# Patient Record
Sex: FEMALE | Race: WHITE | Employment: FULL TIME | URBAN - METROPOLITAN AREA
[De-identification: names, ages, dates, MRNs, and addresses within clinical notes are randomized per-mention and may not be internally consistent; named-entity substitution may affect disease eponyms.]

---

## 2022-05-11 RX ORDER — CETIRIZINE HYDROCHLORIDE 10 MG/1
CAPSULE, LIQUID FILLED ORAL
COMMUNITY

## 2022-05-11 NOTE — PROGRESS NOTES
Patient completed phone assessment and gave verbal understanding of pre operative education. Patient POC urine dipstick order entered per policy.

## 2022-05-13 RX ORDER — TRANEXAMIC ACID 100 MG/ML
100 INJECTION, SOLUTION INTRAVENOUS ONCE
Status: CANCELLED | OUTPATIENT
Start: 2022-05-13 | End: 2022-05-13

## 2022-05-16 ENCOUNTER — APPOINTMENT (OUTPATIENT)
Dept: GENERAL RADIOLOGY | Age: 37
End: 2022-05-16
Attending: ORTHOPAEDIC SURGERY
Payer: COMMERCIAL

## 2022-05-16 ENCOUNTER — ANESTHESIA EVENT (OUTPATIENT)
Dept: SURGERY | Age: 37
End: 2022-05-16
Payer: COMMERCIAL

## 2022-05-16 ENCOUNTER — ANESTHESIA (OUTPATIENT)
Dept: SURGERY | Age: 37
End: 2022-05-16
Payer: COMMERCIAL

## 2022-05-16 ENCOUNTER — HOSPITAL ENCOUNTER (OUTPATIENT)
Age: 37
Discharge: HOME | End: 2022-05-17
Attending: ORTHOPAEDIC SURGERY | Admitting: ORTHOPAEDIC SURGERY
Payer: COMMERCIAL

## 2022-05-16 DIAGNOSIS — M25.361 PATELLOFEMORAL INSTABILITY OF RIGHT KNEE WITH PAIN: Primary | ICD-10-CM

## 2022-05-16 DIAGNOSIS — M25.561 PATELLOFEMORAL INSTABILITY OF RIGHT KNEE WITH PAIN: Primary | ICD-10-CM

## 2022-05-16 LAB
ALBUMIN SERPL-MCNC: 3.4 G/DL (ref 3.5–5)
ALBUMIN/GLOB SERPL: 1 {RATIO} (ref 1.1–2.2)
ALP SERPL-CCNC: 21 U/L (ref 45–117)
ALT SERPL-CCNC: 14 U/L (ref 12–78)
ANION GAP SERPL CALC-SCNC: 9 MMOL/L (ref 5–15)
AST SERPL W P-5'-P-CCNC: 17 U/L (ref 15–37)
BASOPHILS # BLD: 0 K/UL (ref 0–0.1)
BASOPHILS NFR BLD: 0 % (ref 0–1)
BILIRUB SERPL-MCNC: 0.4 MG/DL (ref 0.2–1)
BUN SERPL-MCNC: 17 MG/DL (ref 6–20)
BUN/CREAT SERPL: 13 (ref 12–20)
CA-I BLD-MCNC: 8.5 MG/DL (ref 8.5–10.1)
CHLORIDE SERPL-SCNC: 106 MMOL/L (ref 97–108)
CO2 SERPL-SCNC: 20 MMOL/L (ref 21–32)
CREAT SERPL-MCNC: 1.26 MG/DL (ref 0.55–1.02)
DIFFERENTIAL METHOD BLD: ABNORMAL
EOSINOPHIL # BLD: 0 K/UL (ref 0–0.4)
EOSINOPHIL NFR BLD: 0 % (ref 0–7)
ERYTHROCYTE [DISTWIDTH] IN BLOOD BY AUTOMATED COUNT: 13.1 % (ref 11.5–14.5)
GLOBULIN SER CALC-MCNC: 3.4 G/DL (ref 2–4)
GLUCOSE SERPL-MCNC: 239 MG/DL (ref 65–100)
HCG UR QL: NEGATIVE
HCT VFR BLD AUTO: 36.8 % (ref 35–47)
HGB BLD-MCNC: 12.1 G/DL (ref 11.5–16)
IMM GRANULOCYTES # BLD AUTO: 0 K/UL (ref 0–0.04)
IMM GRANULOCYTES NFR BLD AUTO: 0 % (ref 0–0.5)
INR PPP: 1.1 (ref 0.9–1.1)
LYMPHOCYTES # BLD: 0.7 K/UL (ref 0.8–3.5)
LYMPHOCYTES NFR BLD: 8 % (ref 12–49)
MCH RBC QN AUTO: 31.2 PG (ref 26–34)
MCHC RBC AUTO-ENTMCNC: 32.9 G/DL (ref 30–36.5)
MCV RBC AUTO: 94.8 FL (ref 80–99)
MONOCYTES # BLD: 0.1 K/UL (ref 0–1)
MONOCYTES NFR BLD: 2 % (ref 5–13)
MRSA DNA SPEC QL NAA+PROBE: NOT DETECTED
NEUTS SEG # BLD: 8.5 K/UL (ref 1.8–8)
NEUTS SEG NFR BLD: 90 % (ref 32–75)
NRBC # BLD: 0 K/UL (ref 0–0.01)
NRBC BLD-RTO: 0 PER 100 WBC
PLATELET # BLD AUTO: 212 K/UL (ref 150–400)
PMV BLD AUTO: 9.9 FL (ref 8.9–12.9)
POTASSIUM SERPL-SCNC: 4.3 MMOL/L (ref 3.5–5.1)
PROT SERPL-MCNC: 6.8 G/DL (ref 6.4–8.2)
PROTHROMBIN TIME: 14.2 SEC (ref 11.9–14.6)
RBC # BLD AUTO: 3.88 M/UL (ref 3.8–5.2)
SODIUM SERPL-SCNC: 135 MMOL/L (ref 136–145)
WBC # BLD AUTO: 9.4 K/UL (ref 3.6–11)

## 2022-05-16 PROCEDURE — 94762 N-INVAS EAR/PLS OXIMTRY CONT: CPT

## 2022-05-16 PROCEDURE — 76010000173 HC OR TIME 3 TO 3.5 HR INTENSV-TIER 1: Performed by: ORTHOPAEDIC SURGERY

## 2022-05-16 PROCEDURE — 74011250636 HC RX REV CODE- 250/636: Performed by: GENERAL ACUTE CARE HOSPITAL

## 2022-05-16 PROCEDURE — 74011000250 HC RX REV CODE- 250: Performed by: GENERAL ACUTE CARE HOSPITAL

## 2022-05-16 PROCEDURE — C1769 GUIDE WIRE: HCPCS | Performed by: ORTHOPAEDIC SURGERY

## 2022-05-16 PROCEDURE — 74011000250 HC RX REV CODE- 250: Performed by: ORTHOPAEDIC SURGERY

## 2022-05-16 PROCEDURE — C1713 ANCHOR/SCREW BN/BN,TIS/BN: HCPCS | Performed by: ORTHOPAEDIC SURGERY

## 2022-05-16 PROCEDURE — 77030003775: Performed by: ORTHOPAEDIC SURGERY

## 2022-05-16 PROCEDURE — 85025 COMPLETE CBC W/AUTO DIFF WBC: CPT

## 2022-05-16 PROCEDURE — 74011250637 HC RX REV CODE- 250/637: Performed by: PHYSICIAN ASSISTANT

## 2022-05-16 PROCEDURE — 77030002933 HC SUT MCRYL J&J -A: Performed by: ORTHOPAEDIC SURGERY

## 2022-05-16 PROCEDURE — 77030031139 HC SUT VCRL2 J&J -A: Performed by: ORTHOPAEDIC SURGERY

## 2022-05-16 PROCEDURE — 80053 COMPREHEN METABOLIC PANEL: CPT

## 2022-05-16 PROCEDURE — 77030008684 HC TU ET CUF COVD -B: Performed by: GENERAL ACUTE CARE HOSPITAL

## 2022-05-16 PROCEDURE — 87641 MR-STAPH DNA AMP PROBE: CPT

## 2022-05-16 PROCEDURE — 74011250636 HC RX REV CODE- 250/636: Performed by: ORTHOPAEDIC SURGERY

## 2022-05-16 PROCEDURE — 77030042022 HC SYST COLD THRPY BREG -B: Performed by: ORTHOPAEDIC SURGERY

## 2022-05-16 PROCEDURE — 77030002922 HC SUT FBRWRE ARTH -B: Performed by: ORTHOPAEDIC SURGERY

## 2022-05-16 PROCEDURE — 74011250637 HC RX REV CODE- 250/637: Performed by: ORTHOPAEDIC SURGERY

## 2022-05-16 PROCEDURE — C1762 CONN TISS, HUMAN(INC FASCIA): HCPCS | Performed by: ORTHOPAEDIC SURGERY

## 2022-05-16 PROCEDURE — 77030029642: Performed by: ORTHOPAEDIC SURGERY

## 2022-05-16 PROCEDURE — 76210000006 HC OR PH I REC 0.5 TO 1 HR: Performed by: ORTHOPAEDIC SURGERY

## 2022-05-16 PROCEDURE — 77030000032 HC CUF TRNQT ZIMM -B: Performed by: ORTHOPAEDIC SURGERY

## 2022-05-16 PROCEDURE — 85610 PROTHROMBIN TIME: CPT

## 2022-05-16 PROCEDURE — 77030026438 HC STYL ET INTUB CARD -A: Performed by: GENERAL ACUTE CARE HOSPITAL

## 2022-05-16 PROCEDURE — 77030013789 HC KT REP BIO TEND ARTH -C: Performed by: ORTHOPAEDIC SURGERY

## 2022-05-16 PROCEDURE — 76000 FLUOROSCOPY <1 HR PHYS/QHP: CPT

## 2022-05-16 PROCEDURE — 77030018933 HC KT TRNSTIB ACL4 ARTH -C: Performed by: ORTHOPAEDIC SURGERY

## 2022-05-16 PROCEDURE — 77030013727 HC IRR FAN PULSVC ZIMM -B: Performed by: ORTHOPAEDIC SURGERY

## 2022-05-16 PROCEDURE — 74011000258 HC RX REV CODE- 258: Performed by: ORTHOPAEDIC SURGERY

## 2022-05-16 PROCEDURE — 81025 URINE PREGNANCY TEST: CPT

## 2022-05-16 PROCEDURE — 2709999900 HC NON-CHARGEABLE SUPPLY: Performed by: ORTHOPAEDIC SURGERY

## 2022-05-16 PROCEDURE — 77030013079 HC BLNKT BAIR HGGR 3M -A: Performed by: GENERAL ACUTE CARE HOSPITAL

## 2022-05-16 PROCEDURE — 77030042316 HC BLD SAW -B: Performed by: ORTHOPAEDIC SURGERY

## 2022-05-16 PROCEDURE — 76060000037 HC ANESTHESIA 3 TO 3.5 HR: Performed by: ORTHOPAEDIC SURGERY

## 2022-05-16 PROCEDURE — 77010033678 HC OXYGEN DAILY

## 2022-05-16 DEVICE — IMPLANTABLE DEVICE: Type: IMPLANTABLE DEVICE | Site: KNEE | Status: FUNCTIONAL

## 2022-05-16 DEVICE — GRAFT HUM TISS W3-10MMX20-38CM GRACILIS TEND FRZN: Type: IMPLANTABLE DEVICE | Site: KNEE | Status: FUNCTIONAL

## 2022-05-16 DEVICE — ANCHOR SUT L19.1MM DIA4.75MM PEEK FULL THRD KNOTLESS EYELET: Type: IMPLANTABLE DEVICE | Site: KNEE | Status: FUNCTIONAL

## 2022-05-16 RX ORDER — DEXAMETHASONE SODIUM PHOSPHATE 4 MG/ML
INJECTION, SOLUTION INTRA-ARTICULAR; INTRALESIONAL; INTRAMUSCULAR; INTRAVENOUS; SOFT TISSUE AS NEEDED
Status: DISCONTINUED | OUTPATIENT
Start: 2022-05-16 | End: 2022-05-16 | Stop reason: HOSPADM

## 2022-05-16 RX ORDER — SODIUM CHLORIDE 0.9 % (FLUSH) 0.9 %
5-40 SYRINGE (ML) INJECTION AS NEEDED
Status: DISCONTINUED | OUTPATIENT
Start: 2022-05-16 | End: 2022-05-17 | Stop reason: HOSPADM

## 2022-05-16 RX ORDER — CEFAZOLIN SODIUM 1 G/3ML
INJECTION, POWDER, FOR SOLUTION INTRAMUSCULAR; INTRAVENOUS
Status: DISCONTINUED
Start: 2022-05-16 | End: 2022-05-16

## 2022-05-16 RX ORDER — OXYCODONE HYDROCHLORIDE 5 MG/1
5 TABLET ORAL
Status: DISCONTINUED | OUTPATIENT
Start: 2022-05-16 | End: 2022-05-17 | Stop reason: HOSPADM

## 2022-05-16 RX ORDER — SODIUM CHLORIDE 0.9 % (FLUSH) 0.9 %
5-40 SYRINGE (ML) INJECTION EVERY 8 HOURS
Status: DISCONTINUED | OUTPATIENT
Start: 2022-05-16 | End: 2022-05-16

## 2022-05-16 RX ORDER — PROPOFOL 10 MG/ML
INJECTION, EMULSION INTRAVENOUS AS NEEDED
Status: DISCONTINUED | OUTPATIENT
Start: 2022-05-16 | End: 2022-05-16 | Stop reason: HOSPADM

## 2022-05-16 RX ORDER — GLYCOPYRROLATE 0.2 MG/ML
INJECTION INTRAMUSCULAR; INTRAVENOUS AS NEEDED
Status: DISCONTINUED | OUTPATIENT
Start: 2022-05-16 | End: 2022-05-16 | Stop reason: HOSPADM

## 2022-05-16 RX ORDER — ACETAMINOPHEN 500 MG
1000 TABLET ORAL ONCE
Status: COMPLETED | OUTPATIENT
Start: 2022-05-16 | End: 2022-05-16

## 2022-05-16 RX ORDER — ROCURONIUM BROMIDE 10 MG/ML
INJECTION, SOLUTION INTRAVENOUS AS NEEDED
Status: DISCONTINUED | OUTPATIENT
Start: 2022-05-16 | End: 2022-05-16 | Stop reason: HOSPADM

## 2022-05-16 RX ORDER — MIDAZOLAM HYDROCHLORIDE 1 MG/ML
INJECTION, SOLUTION INTRAMUSCULAR; INTRAVENOUS AS NEEDED
Status: DISCONTINUED | OUTPATIENT
Start: 2022-05-16 | End: 2022-05-16 | Stop reason: HOSPADM

## 2022-05-16 RX ORDER — SODIUM CHLORIDE, SODIUM LACTATE, POTASSIUM CHLORIDE, CALCIUM CHLORIDE 600; 310; 30; 20 MG/100ML; MG/100ML; MG/100ML; MG/100ML
INJECTION, SOLUTION INTRAVENOUS
Status: DISCONTINUED | OUTPATIENT
Start: 2022-05-16 | End: 2022-05-16 | Stop reason: HOSPADM

## 2022-05-16 RX ORDER — ACETAMINOPHEN 500 MG
1000 TABLET ORAL EVERY 6 HOURS
Status: DISCONTINUED | OUTPATIENT
Start: 2022-05-16 | End: 2022-05-17 | Stop reason: HOSPADM

## 2022-05-16 RX ORDER — MIDAZOLAM HYDROCHLORIDE 1 MG/ML
1 INJECTION, SOLUTION INTRAMUSCULAR; INTRAVENOUS AS NEEDED
Status: CANCELLED | OUTPATIENT
Start: 2022-05-16

## 2022-05-16 RX ORDER — NALOXONE HYDROCHLORIDE 0.4 MG/ML
0.4 INJECTION, SOLUTION INTRAMUSCULAR; INTRAVENOUS; SUBCUTANEOUS AS NEEDED
Status: DISCONTINUED | OUTPATIENT
Start: 2022-05-16 | End: 2022-05-17 | Stop reason: HOSPADM

## 2022-05-16 RX ORDER — ASPIRIN 325 MG
325 TABLET ORAL 2 TIMES DAILY
Status: DISCONTINUED | OUTPATIENT
Start: 2022-05-16 | End: 2022-05-17 | Stop reason: HOSPADM

## 2022-05-16 RX ORDER — LIDOCAINE HYDROCHLORIDE 20 MG/ML
INJECTION, SOLUTION EPIDURAL; INFILTRATION; INTRACAUDAL; PERINEURAL AS NEEDED
Status: DISCONTINUED | OUTPATIENT
Start: 2022-05-16 | End: 2022-05-16 | Stop reason: HOSPADM

## 2022-05-16 RX ORDER — CYCLOBENZAPRINE HCL 10 MG
5 TABLET ORAL
Status: DISCONTINUED | OUTPATIENT
Start: 2022-05-16 | End: 2022-05-17 | Stop reason: HOSPADM

## 2022-05-16 RX ORDER — ONDANSETRON 4 MG/1
4 TABLET, FILM COATED ORAL
Status: DISCONTINUED | OUTPATIENT
Start: 2022-05-16 | End: 2022-05-17 | Stop reason: HOSPADM

## 2022-05-16 RX ORDER — GABAPENTIN 300 MG/1
300 CAPSULE ORAL ONCE
Status: COMPLETED | OUTPATIENT
Start: 2022-05-16 | End: 2022-05-16

## 2022-05-16 RX ORDER — ONDANSETRON 2 MG/ML
4 INJECTION INTRAMUSCULAR; INTRAVENOUS AS NEEDED
Status: DISCONTINUED | OUTPATIENT
Start: 2022-05-16 | End: 2022-05-16 | Stop reason: HOSPADM

## 2022-05-16 RX ORDER — FENTANYL CITRATE 50 UG/ML
25 INJECTION, SOLUTION INTRAMUSCULAR; INTRAVENOUS
Status: DISCONTINUED | OUTPATIENT
Start: 2022-05-16 | End: 2022-05-16 | Stop reason: HOSPADM

## 2022-05-16 RX ORDER — SODIUM CHLORIDE 9 MG/ML
75 INJECTION, SOLUTION INTRAVENOUS CONTINUOUS
Status: DISCONTINUED | OUTPATIENT
Start: 2022-05-16 | End: 2022-05-17 | Stop reason: HOSPADM

## 2022-05-16 RX ORDER — FENTANYL CITRATE 50 UG/ML
INJECTION, SOLUTION INTRAMUSCULAR; INTRAVENOUS AS NEEDED
Status: DISCONTINUED | OUTPATIENT
Start: 2022-05-16 | End: 2022-05-16 | Stop reason: HOSPADM

## 2022-05-16 RX ORDER — CELECOXIB 200 MG/1
400 CAPSULE ORAL ONCE
Status: COMPLETED | OUTPATIENT
Start: 2022-05-16 | End: 2022-05-16

## 2022-05-16 RX ORDER — ROPIVACAINE HYDROCHLORIDE 5 MG/ML
INJECTION, SOLUTION EPIDURAL; INFILTRATION; PERINEURAL
Status: DISPENSED
Start: 2022-05-16 | End: 2022-05-17

## 2022-05-16 RX ORDER — NEOSTIGMINE METHYLSULFATE 5 MG/5 ML
SYRINGE (ML) INTRAVENOUS AS NEEDED
Status: DISCONTINUED | OUTPATIENT
Start: 2022-05-16 | End: 2022-05-16 | Stop reason: HOSPADM

## 2022-05-16 RX ORDER — MORPHINE SULFATE 10 MG/ML
2 INJECTION, SOLUTION INTRAMUSCULAR; INTRAVENOUS
Status: DISCONTINUED | OUTPATIENT
Start: 2022-05-16 | End: 2022-05-17 | Stop reason: HOSPADM

## 2022-05-16 RX ORDER — SODIUM CHLORIDE, SODIUM LACTATE, POTASSIUM CHLORIDE, CALCIUM CHLORIDE 600; 310; 30; 20 MG/100ML; MG/100ML; MG/100ML; MG/100ML
20 INJECTION, SOLUTION INTRAVENOUS CONTINUOUS
Status: DISCONTINUED | OUTPATIENT
Start: 2022-05-16 | End: 2022-05-17 | Stop reason: HOSPADM

## 2022-05-16 RX ORDER — HYDROMORPHONE HYDROCHLORIDE 1 MG/ML
INJECTION, SOLUTION INTRAMUSCULAR; INTRAVENOUS; SUBCUTANEOUS AS NEEDED
Status: DISCONTINUED | OUTPATIENT
Start: 2022-05-16 | End: 2022-05-16 | Stop reason: HOSPADM

## 2022-05-16 RX ORDER — ONDANSETRON 2 MG/ML
INJECTION INTRAMUSCULAR; INTRAVENOUS AS NEEDED
Status: DISCONTINUED | OUTPATIENT
Start: 2022-05-16 | End: 2022-05-16 | Stop reason: HOSPADM

## 2022-05-16 RX ORDER — CEFAZOLIN SODIUM 1 G/3ML
INJECTION, POWDER, FOR SOLUTION INTRAMUSCULAR; INTRAVENOUS AS NEEDED
Status: DISCONTINUED | OUTPATIENT
Start: 2022-05-16 | End: 2022-05-16 | Stop reason: HOSPADM

## 2022-05-16 RX ADMIN — GLYCOPYRROLATE 0.6 MG: 0.2 INJECTION INTRAMUSCULAR; INTRAVENOUS at 11:45

## 2022-05-16 RX ADMIN — SODIUM CHLORIDE, POTASSIUM CHLORIDE, SODIUM LACTATE AND CALCIUM CHLORIDE: 600; 310; 30; 20 INJECTION, SOLUTION INTRAVENOUS at 09:27

## 2022-05-16 RX ADMIN — SODIUM CHLORIDE, POTASSIUM CHLORIDE, SODIUM LACTATE AND CALCIUM CHLORIDE 20 ML/HR: 600; 310; 30; 20 INJECTION, SOLUTION INTRAVENOUS at 08:30

## 2022-05-16 RX ADMIN — ONDANSETRON 4 MG: 2 INJECTION INTRAMUSCULAR; INTRAVENOUS at 10:26

## 2022-05-16 RX ADMIN — OXYCODONE 5 MG: 5 TABLET ORAL at 15:03

## 2022-05-16 RX ADMIN — HYDROMORPHONE HYDROCHLORIDE 1 MG: 1 INJECTION, SOLUTION INTRAMUSCULAR; INTRAVENOUS; SUBCUTANEOUS at 12:11

## 2022-05-16 RX ADMIN — SODIUM CHLORIDE, POTASSIUM CHLORIDE, SODIUM LACTATE AND CALCIUM CHLORIDE: 600; 310; 30; 20 INJECTION, SOLUTION INTRAVENOUS at 10:37

## 2022-05-16 RX ADMIN — GABAPENTIN 300 MG: 300 CAPSULE ORAL at 08:32

## 2022-05-16 RX ADMIN — PHENYLEPHRINE HYDROCHLORIDE 100 MCG: 10 INJECTION INTRAVENOUS at 09:33

## 2022-05-16 RX ADMIN — ONDANSETRON HYDROCHLORIDE 4 MG: 4 TABLET, FILM COATED ORAL at 21:08

## 2022-05-16 RX ADMIN — ACETAMINOPHEN 1000 MG: 500 TABLET ORAL at 18:12

## 2022-05-16 RX ADMIN — HYDROMORPHONE HYDROCHLORIDE 1 MG: 1 INJECTION, SOLUTION INTRAMUSCULAR; INTRAVENOUS; SUBCUTANEOUS at 11:01

## 2022-05-16 RX ADMIN — FENTANYL CITRATE 100 MCG: 50 INJECTION, SOLUTION INTRAMUSCULAR; INTRAVENOUS at 09:04

## 2022-05-16 RX ADMIN — ROCURONIUM BROMIDE 50 MG: 50 INJECTION, SOLUTION INTRAVENOUS at 09:06

## 2022-05-16 RX ADMIN — CELECOXIB 400 MG: 200 CAPSULE ORAL at 08:32

## 2022-05-16 RX ADMIN — ONDANSETRON HYDROCHLORIDE 4 MG: 4 TABLET, FILM COATED ORAL at 15:32

## 2022-05-16 RX ADMIN — ACETAMINOPHEN 1000 MG: 500 TABLET ORAL at 23:55

## 2022-05-16 RX ADMIN — OXYCODONE 5 MG: 5 TABLET ORAL at 21:06

## 2022-05-16 RX ADMIN — WATER 2 G: 1 INJECTION INTRAMUSCULAR; INTRAVENOUS; SUBCUTANEOUS at 19:11

## 2022-05-16 RX ADMIN — ACETAMINOPHEN 1000 MG: 500 TABLET ORAL at 08:32

## 2022-05-16 RX ADMIN — MEPERIDINE HYDROCHLORIDE 12.5 MG: 25 INJECTION, SOLUTION INTRAMUSCULAR; INTRAVENOUS; SUBCUTANEOUS at 12:46

## 2022-05-16 RX ADMIN — SODIUM CHLORIDE 75 ML/HR: 9 INJECTION, SOLUTION INTRAVENOUS at 19:09

## 2022-05-16 RX ADMIN — LIDOCAINE HYDROCHLORIDE 100 MG: 20 INJECTION, SOLUTION EPIDURAL; INFILTRATION; INTRACAUDAL; PERINEURAL at 09:04

## 2022-05-16 RX ADMIN — Medication 3 MG: at 11:45

## 2022-05-16 RX ADMIN — PROPOFOL 200 MG: 10 INJECTION, EMULSION INTRAVENOUS at 09:05

## 2022-05-16 RX ADMIN — ASPIRIN 325 MG ORAL TABLET 325 MG: 325 PILL ORAL at 20:18

## 2022-05-16 RX ADMIN — CEFAZOLIN SODIUM 2 G: 1 INJECTION, POWDER, FOR SOLUTION INTRAMUSCULAR; INTRAVENOUS at 09:09

## 2022-05-16 RX ADMIN — MIDAZOLAM HYDROCHLORIDE 2 MG: 2 INJECTION, SOLUTION INTRAMUSCULAR; INTRAVENOUS at 09:01

## 2022-05-16 RX ADMIN — TRANEXAMIC ACID 2 G: 1 INJECTION, SOLUTION INTRAVENOUS at 09:13

## 2022-05-16 RX ADMIN — DEXAMETHASONE SODIUM PHOSPHATE 8 MG: 4 INJECTION, SOLUTION INTRA-ARTICULAR; INTRALESIONAL; INTRAMUSCULAR; INTRAVENOUS; SOFT TISSUE at 10:28

## 2022-05-16 NOTE — ANESTHESIA PREPROCEDURE EVALUATION
Relevant Problems   No relevant active problems       Anesthetic History   No history of anesthetic complications            Review of Systems / Medical History      Pulmonary  Within defined limits                 Neuro/Psych   Within defined limits           Cardiovascular                  Exercise tolerance: >4 METS     GI/Hepatic/Renal  Within defined limits              Endo/Other  Within defined limits           Other Findings              Physical Exam    Airway  Mallampati: II  TM Distance: > 6 cm  Neck ROM: normal range of motion   Mouth opening: Normal     Cardiovascular  Regular rate and rhythm,  S1 and S2 normal,  no murmur, click, rub, or gallop             Dental  No notable dental hx       Pulmonary                 Abdominal  Abdominal exam normal       Other Findings            Anesthetic Plan    ASA: 1  Anesthesia type: general and regional - popliteal fossa block and saphenous block          Induction: Intravenous  Anesthetic plan and risks discussed with: Patient

## 2022-05-16 NOTE — ANESTHESIA POSTPROCEDURE EVALUATION
Procedure(s):  RIGHT KNEE TIBIAL TUBERCLE OSTEOTOMY AND MEDIAL PATELLOFEMORAL LIGAMENT RECONSTRUCTION WITH ALLOGRAFT; FIX KNEE CAP INSTABILITY (CHOICE ANESTH).     general, regional    Anesthesia Post Evaluation      Multimodal analgesia: multimodal analgesia used between 6 hours prior to anesthesia start to PACU discharge  Patient location during evaluation: bedside  Patient participation: complete - patient participated  Level of consciousness: awake  Pain score: 0  Pain management: adequate  Airway patency: patent  Anesthetic complications: no  Cardiovascular status: acceptable  Respiratory status: acceptable  Hydration status: acceptable  Post anesthesia nausea and vomiting:  none  Final Post Anesthesia Temperature Assessment:  Normothermia (36.0-37.5 degrees C)      INITIAL Post-op Vital signs:   Vitals Value Taken Time   /71 05/16/22 1235   Temp 36.8 °C (98.3 °F) 05/16/22 1224   Pulse 91 05/16/22 1224   Resp 19 05/16/22 1224   SpO2 92 % 05/16/22 1224

## 2022-05-16 NOTE — OP NOTES
Operative Note    Patient: Estelita Trammell  YOB: 1985  MRN: [id number]    Date of Procedure: 5/16/2022     Pre-Op Diagnosis: Patellar instability of right knee [M25.361]    Post-Op Diagnosis: Same as preoperative diagnosis. Procedure(s):  RIGHT KNEE TIBIAL TUBERCLE OSTEOTOMY AND MEDIAL PATELLOFEMORAL LIGAMENT RECONSTRUCTION WITH ALLOGRAFT and VMO imbrication CHOICE ANESTH)    Surgeon(s):  Cristina Davila MD    Surgical Assistant: Surg Asst-1: Frank Genao    Anesthesia: Other     Estimated Blood Loss (mL):  less than 883     Complications: None    Specimens: * No specimens in log *     Implants:   Implant Name Type Inv. Item Serial No.  Lot No. LRB No. Used Action   GRAFT HUM TISS [id number] GRACILIS TEND FRZN - [id number]  GRAFT HUM TISS [id number] GRACILIS TEND FRZN [id number] [employer] N/A Right 1 Implanted   SYSTEM ANCHOR 4.75 BC LOOP N TRACK - SN/A  SYSTEM ANCHOR 4.75 BC LOOP Edmond [employer] N/A [employer]WD [id number] Right 1 Implanted   ANCHOR SUT PEEK 4.75X19.1MM -- SWIVELOCK - SN/A  ANCHOR SUT PEEK 4.75X19.1MM -- SWIVELOCK N/A [id number] Right 1 Implanted   SCREW INTFR L23MM DIA7MM BIOCOMPOSITE FACILITATE IORT TISS - SN/A  SCREW INTFR L23MM DIA7MM BIOCOMPOSITE FACILITATE IORT TISS N/A [employer]WD [id number] Right 1 Implanted       Drains: * No LDAs found *    Findings: lateral tracking patella   MPFL laxity     Indication for procedure : 77-year-old well-known patient to my office came in with a presenting complaint of right knee pain with instability. Clinical examination in the office was consistent with patella instability. She previously underwent a right knee arthroscopy with partial lateral release without much success. We had an extensive discussion about the treatment options based on her symptoms and the clinical findings and the arthroscopy findings.   Patient does have some arthritis along the patellofemoral compartment but her predominant complaint is pain and instability. We discussed the role of a patellofemoral reconstruction including a tibial tubercle osteotomy and MPFL reconstruction. She understands that this would address the pain and the instability symptoms related to the instability but she may have some residual pain with osteoarthritis in the patellofemoral compartment. The role of a patellofemoral replacement was also reviewed. Based on the fact that her predominant complaint is extremity we will address this at this time and if the patient is still symptomatic a patellofemoral replacement will be performed in a staged set up. Literature and the paucity of the literature regarding this complex problem was reviewed with the patient in multiple stages prior to determining the plan of action at this time. Inherent risk of nonunion, malunion, infection, residual instability, graft rejection, DVT and pulmonary embolism was reviewed with the patient. Informed surgical consent was obtained subsequent which she was scheduled for surgery today. Preoperative evaluation included x-rays CT scan and MRI which revealed a patella jose david along with lateral subluxation of the patella with  patellofemoral arthritis. Preoperative plan involved anteriorization and distalization of the tibial tubercle along with MPFL reconstruction with hamstring allograft. Detailed Description of Procedure: The patient and operative site were identified in the preoperative holding area. After induction of anesthesia the patient was positioned supine on the OR table. A lateral thigh post was positioned. Intravenous antibiotics were administered for surgical prophylaxis. A high thigh tourniquet was set at 300 mmHg. The right lower extremity was prepped and draped from the thigh to the toes in a sterile field with ChloraPrep. Timeout was called.       Patellar tracking was assessed and was found to have a lateral translation of greater than 1-1/2 quadrants of the patella. Obvious dislocation was not noted but subluxation of the patella was noted around 45 degrees of flexion. The tourniquet was inflated with exsanguination. We proceeded with the realignment procedure. An anterior midline incision was made, approximately 6 cm, from the tibial tubercle distally. Full-thickness fasciocutaneous flaps were elevated. The tibial tubercle and the proximal crest were nicely exposed and visualized, for performing the osteotomy. The insertion of the patellar tendon was likewise well displayed. I made a  5cm long osteotomy, with a depth of 1.5 cm at its thickest part, and the level of the tubercle. An oblique ledge was created at the proximal end, allowing an excellent buttress to support the osteotomized fragment. The osteotomy was carefully mobilized without any fracture. The proximal end was displaced medially by 5 mm. A K wire was used to immobilize the fragment. The knee was examined and showed satisfactory patellar tracking and range of motion. I inserted 2 x 5.0 cancellous  screws from anterior to posterior, with excellent purchase and interfragmentary compression. Fluoroscopic images are obtained and saved, confirming satisfactory reduction of the osteotomy. The wound was irrigated and temporarily closed a wet sponge     Attention was then placed into the patellofemoral reconstruction. A small curvilinear incision was made along the superior medial aspect of the patella. Skin subtenons tissue was resected and then the plane of the MPFL    The graft was prepared on the back table. A gracilis allograft was used. It was measured to be 200 mm in length and hence was able to be docked into the femur based on the length. Reviewed stage along the loose ends using #2 FiberWire.   The medial border of the patella was palpated and 2 cm skin incision was made along the superomedial corner extending to the center of the medial edge of the patella. The exposed medial edge was then identified and under fluoroscopic guidance 3 mm distal to the proximal medial corner of the patella 1.1 mm drill was placed in a transverse fashion to a depth of 25 mm. Second guidepin was then placed parallel to it 15 mm distal to it. The 2 guide pins were then overdrilled using a 3.5 cannulated reamer to a depth of 18 mm. Details of one of the graft was then reinforced into these drill holes using 4.75 anchor each. This was reassessed by pulling it down with excellent tension. Attention was placed onto the femoral insertion. The proper position of the femoral insertion was identified under fluoroscopic guidance approximately 1 mm anterior to the posterior cortex and 2.5 mm distal to the posterior articular border of the medial femoral condyle and proximal to the level of the posterior point of the Blumenstein's line. 4 mm plated drill pin was then drilled across the femur. The isometry of the MPFL was assessed by passing the graft through the dissection performed in the layer to along the medial aspect of the knee joint through to this guidepin and passing the FiberWire over it. Excellent tension was obtained with appropriate isometry. The loose ends of the FiberWire that was at the center of the gracilis graft was then loaded onto the guidepin and taken out through the lateral cortex which facilitated docking the excess length of the graft. This was reinforced and attached onto the femoral tunnel using a Bio-Tenodesis screw. Excellent tension was obtained in the knee joint was made sure to be in approximately 30 degrees of flexion with medial pull onto the graft at the time of tensioning. Once this was accomplished excess suture was then taken down VMO medication was performed along the medial aspect of the patella and repeat assessment of the tracking was performed.   Excellent tracking of the kneecap was noted along with no obvious lateral subluxation of the patella. Wound was then thoroughly irrigated at this time and sequentially closed with #1 Vicryl for the deep tissue 2-0 Vicryl for the subcutaneous tissue and 3-0 Monocryl for the skin. Sterile dressing was placed around the wound. It is to be noted prior to the wound closure tourniquet was let down and no significant bleeders were noted. The tibialis anterior fascia was then released to avoid any concern for compartment tension increased after the surgery. Wound was thoroughly irrigated and closed after this. Postop instructions:    -Nonweightbearing right lower extremity    -Start physical therapy in 2 weeks time  -Enteric-coated aspirin for DVT prophylaxis      The patient tolerated the procedure well and was transferred to the recovery room in stable condition.       Electronically Signed by Bill Marshall MD on 5/16/2022 at 11:53 AM

## 2022-05-16 NOTE — PROGRESS NOTES
SON)/ [place] [facility]patient Observation Notice (Christina Li) provided to patient/representative with verbal explanation of the notice. Time allotted for questions regarding the notice. Patient /representative provided a completed copy of theSON/VOON notice. Copy placed on bedside chart.

## 2022-05-16 NOTE — PROGRESS NOTES
Reason for Admission:  Patellar Instability                      RUR Score:     N/A                Plan for utilizing home health:    [facility]      PCP: First and Last name:  None     Name of Practice:    Are you a current patient: Yes/No:    Approximate date of last visit:    Can you participate in a virtual visit with your PCP:                     Current Advanced Directive/Advance Care Plan: Full Code      Healthcare Decision Maker:   Click here to complete [address] including selection of the Healthcare Decision Maker Relationship (ie \"Primary\")      Bev Duarte spouse, [phone]                       Transition of Care Plan:       Met f/f with Pt, she confirmed that the information on the face sheet is correct. Pt stated that she lives with her . Pt stated that [facility] has already contacted her for [facility] services. Pt stated that she needs a walker, choice letter signed and placed on Pt chart. CM will send referral to [address] [place] [address] stated that she is independent with ADL. Pt stated that she uses [facility] on [address] [address]. Pt  will give Pt a ride when she is D/C. CM Dispo: Home with .

## 2022-05-17 VITALS
BODY MASS INDEX: 29.73 KG/M2 | TEMPERATURE: 98.4 F | OXYGEN SATURATION: 98 % | WEIGHT: 185 LBS | SYSTOLIC BLOOD PRESSURE: 121 MMHG | DIASTOLIC BLOOD PRESSURE: 73 MMHG | HEIGHT: 66 IN | RESPIRATION RATE: 18 BRPM | HEART RATE: 87 BPM

## 2022-05-17 PROCEDURE — 97161 PT EVAL LOW COMPLEX 20 MIN: CPT

## 2022-05-17 PROCEDURE — 74011250637 HC RX REV CODE- 250/637: Performed by: ORTHOPAEDIC SURGERY

## 2022-05-17 PROCEDURE — 74011250637 HC RX REV CODE- 250/637: Performed by: PHYSICIAN ASSISTANT

## 2022-05-17 PROCEDURE — 97116 GAIT TRAINING THERAPY: CPT

## 2022-05-17 PROCEDURE — 74011250636 HC RX REV CODE- 250/636: Performed by: ORTHOPAEDIC SURGERY

## 2022-05-17 PROCEDURE — 74011000250 HC RX REV CODE- 250: Performed by: ORTHOPAEDIC SURGERY

## 2022-05-17 RX ORDER — DICLOFENAC SODIUM 50 MG/1
50 TABLET, DELAYED RELEASE ORAL 2 TIMES DAILY
Qty: 10 TABLET | Refills: 0 | Status: SHIPPED | OUTPATIENT
Start: 2022-05-17 | End: 2022-05-22

## 2022-05-17 RX ORDER — ONDANSETRON 4 MG/1
4 TABLET, ORALLY DISINTEGRATING ORAL
Qty: 20 TABLET | Refills: 0 | Status: SHIPPED | OUTPATIENT
Start: 2022-05-17

## 2022-05-17 RX ORDER — ACETAMINOPHEN 500 MG
1000 TABLET ORAL
Qty: 30 TABLET | Refills: 0 | Status: SHIPPED
Start: 2022-05-17

## 2022-05-17 RX ORDER — OXYCODONE HYDROCHLORIDE 5 MG/1
5 TABLET ORAL
Qty: 15 TABLET | Refills: 0 | Status: SHIPPED | OUTPATIENT
Start: 2022-05-17 | End: 2022-05-21

## 2022-05-17 RX ORDER — ASPIRIN 325 MG
325 TABLET ORAL DAILY
Qty: 7 TABLET | Refills: 0 | Status: SHIPPED | OUTPATIENT
Start: 2022-05-17 | End: 2022-05-24

## 2022-05-17 RX ADMIN — ACETAMINOPHEN 1000 MG: 500 TABLET ORAL at 06:26

## 2022-05-17 RX ADMIN — ASPIRIN 325 MG ORAL TABLET 325 MG: 325 PILL ORAL at 09:25

## 2022-05-17 RX ADMIN — ONDANSETRON HYDROCHLORIDE 4 MG: 4 TABLET, FILM COATED ORAL at 09:25

## 2022-05-17 RX ADMIN — OXYCODONE 5 MG: 5 TABLET ORAL at 03:09

## 2022-05-17 RX ADMIN — ONDANSETRON HYDROCHLORIDE 4 MG: 4 TABLET, FILM COATED ORAL at 03:08

## 2022-05-17 RX ADMIN — OXYCODONE 5 MG: 5 TABLET ORAL at 09:26

## 2022-05-17 RX ADMIN — WATER 2 G: 1 INJECTION INTRAMUSCULAR; INTRAVENOUS; SUBCUTANEOUS at 03:09

## 2022-05-17 NOTE — ROUTINE PROCESS
Bedside shift change report given to Aric Segura (oncoming nurse) by Megan Hartmann (offgoing nurse). Report included the following information SBAR.

## 2022-05-17 NOTE — PROGRESS NOTES
PHYSICAL THERAPY EVALUATION/DISCHARGE  Patient: Primitivo Benitez (22 y.o. female)  Date: 5/17/2022  Primary Diagnosis: Patellar instability of right knee [M25.361]  Patellofemoral instability of right knee with pain [M25.361, M25.561]  Procedure(s) (LRB):  RIGHT KNEE TIBIAL TUBERCLE OSTEOTOMY AND MEDIAL PATELLOFEMORAL LIGAMENT RECONSTRUCTION WITH ALLOGRAFT; FIX KNEE CAP INSTABILITY (CHOICE ANESTH) (Right) 1 Day Post-Op   Precautions: fall, NWB on R LE with knee brace locked         ASSESSMENT  Pt s/p tibial tubercle osteotomy with MPFL ligament reconstruction with VMO imbrication of R knee on 5/16/22 due to patellafemoral instability. Pt with no other significant PMH. Pt reports she lives with her  in a two story home but will reside on the first floor once returning home. Pt has 5 VALERIE with B rails and was I with ADL's and ambulation without AD, and driving prior to admission. 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 Pt reports having crutches at home and reporting 6/10 pain in R knee at rest, 7/10 with activity. Based on the objective data described below, the patient presents with decreased R LE strength and ROM, impaired balance, able to perform bed mobility independently and transfers with CGA for safety. Pt amb 30 ft with RW and CGA NWB on R LE with knee brace locked. Pt with good balance and maintaining NWB status during ambulation but requiring cues to slow down with ambulation and stay inside the walker. Pt also instructed to wait for a brief period when first standing up to be sure she is not dizzy before attempting to ambulate. Pt verbally demos understanding. Pt went up and down 4 steps with B handrails with CGA and good form.   Pt is scheduled for discharged today and feel pt is fine to d/c to home with family care and RW.  D/C from PT secondary to pt scheduled for discharge today from hospital.     Other factors to consider for discharge: NWB status, level of assist     Further skilled acute physical therapy is not indicated at this time. PLAN :  Recommendation for discharge: (in order for the patient to meet his/her long term goals)  No skilled physical therapy/ follow up rehabilitation needs identified at this time. This discharge recommendation:  Has been made in collaboration with the attending provider and/or case management    IF patient discharges home will need the following DME: rolling walker       SUBJECTIVE:   Patient stated it is throbbing now.     OBJECTIVE DATA SUMMARY:   HISTORY:    History reviewed. No pertinent past medical history. Past Surgical History:   Procedure Laterality Date    HX KNEE ARTHROSCOPY Right     HX OTHER SURGICAL      during childbirth stage 4 tear    HX WISDOM TEETH EXTRACTION         Prior level of function: I with ADL's and ambulation without AD prior to admission  Personal factors and/or comorbidities impacting plan of care: impaired mobility, level of assist    Home Situation  Home Environment: Private residence  # Steps to Enter: 5  Rails to Enter: Yes  Hand Rails : Bilateral  One/Two Story Residence: Two story, live on 1st floor  Living Alone: No  Support Systems: Spouse/Significant Other  Patient Expects to be Discharged to[de-identified] Home with family assistance  Current DME Used/Available at Home: Crutches    EXAMINATION/PRESENTATION/DECISION MAKING:   Critical Behavior:  Neurologic State: Alert  Orientation Level: Oriented X4  Cognition: Appropriate decision making,Follows commands     Hearing:   Auditory  Auditory Impairment: None  Range Of Motion:  AROM: Generally decreased, functional                       Strength:    Strength: Generally decreased, functional      5/5 throughout L LE, R ankle DF/PF 3-/5                 Functional Mobility:  Bed Mobility:  Rolling: Independent  Supine to Sit: Independent     Scooting: Independent  Transfers:  Sit to Stand: Contact guard assistance  Stand to Sit: Contact guard assistance                       Balance:   Sitting: Intact  Standing: Impaired  Standing - Static: Good;Constant support  Standing - Dynamic : Fair;Constant support  Ambulation/Gait Training:  Distance (ft): 30 Feet (ft)  Assistive Device: Walker, rolling;Gait belt  Ambulation - Level of Assistance: Contact guard assistance     Gait Description (WDL): Exceptions to WDL     Right Side Weight Bearing: Non-weight bearing  Left Side Weight Bearing: Full        Speed/Kathia: Slow  Step Length: Left shortened                    Stairs:  Number of Stairs Trained: 4  Stairs - Level of Assistance: Contact guard assistance   Rail Use: Both    Functional Measure:         Physical Therapy Evaluation Charge Determination   History Examination Presentation Decision-Making   LOW Complexity : Zero comorbidities / personal factors that will impact the outcome / POC LOW Complexity : 1-2 Standardized tests and measures addressing body structure, function, activity limitation and / or participation in recreation  LOW Complexity : Stable, uncomplicated  LOW Complexity : FOTO score of       Based on the above components, the patient evaluation is determined to be of the following complexity level: LOW     Pain Ratin-7/10 in R knee    Activity Tolerance:   Good  Please refer to the flowsheet for vital signs taken during this treatment. After treatment patient left in no apparent distress:   Sitting in chair and Call bell within reach    COMMUNICATION/EDUCATION:   The patients plan of care was discussed with: Registered nurse. Patient/family agree to work toward stated goals and plan of care.     Thank you for this referral.  Val Ignacio   Time Calculation: 25 mins

## 2022-05-17 NOTE — PROGRESS NOTES
Discharge instructions reviewed with patient and her . They stated they had no further questions. Follow up appointments were made and prescriptions were sent to patient's preferred pharmacy. Vital signs stable, immobilizer in place and aligned, surgical dressing clean, dry, intact. Pt was wheeled down by staff and left in a private vehicle with her .

## 2022-05-17 NOTE — PROGRESS NOTES
Pt has a discharge order in for today. Pt is being discharged home self care. Pt has been cleared by attending provider. Pt is not being discharged home with an IV, rivera, or drain. Surgical dressing on right leg is clean, dry, and intact. Immobilizer in place and aligned. Discharge plan of care/case management plan validated with provider discharge order. The patient is a 29y Female complaining of

## 2022-05-17 NOTE — DISCHARGE INSTRUCTIONS
- Keep dressing clean, dry, and in place until follow up. - Start outpatient physical therapy in 2 weeks. - Apply Polar Care or Reusable Ice Packs to right leg 30 minutes on and 30 minutes off for 48 hours; Then use as needed.

## 2022-05-17 NOTE — PROGRESS NOTES
CM reviewed chart,noted discharge order. Patient is clear to discharge home self care by CM. Nurse is aware. Discharge plan of care/case management plan validated with provider discharge order.

## 2022-05-17 NOTE — PROGRESS NOTES
Orthopedic progress note    Date:2022       Room:[facility]  Patient Dominic Breen     YOB: 1985     Age:36 y.o. Subjective    33-year-old female status post tibial tubercle osteotomy with MPFL ligament reconstruction with VMO imbrication right knee. Postop day #1. Patient doing well. She did complain of moderate pain yesterday. The pain is better this morning. She also had episodes of nausea and vomiting yesterday that has resolved. She did ambulate with nursing yesterday she did have some difficulty with crutches ambulated better with a walker. No other complaints at this time. Objective           Vitals Last 24 Hours:  TEMPERATURE:  Temp  Av °F (36.7 °C)  Min: 97.3 °F (36.3 °C)  Max: 98.4 °F (36.9 °C)  RESPIRATIONS RANGE: Resp  Av.6  Min: 14  Max: 19  PULSE OXIMETRY RANGE: SpO2  Av.5 %  Min: 92 %  Max: 98 %  PULSE RANGE: Pulse  Av  Min: 63  Max: 94  BLOOD PRESSURE RANGE: Systolic (08NFJ), FZU:559 , Min:102 , HLD:962   ; Diastolic (19VTD), DGQ:21, Min:62, Max:74    Current Facility-Administered Medications   Medication Dose Route Frequency    lactated Ringers infusion  20 mL/hr IntraVENous CONTINUOUS    0.9% sodium chloride infusion  75 mL/hr IntraVENous CONTINUOUS    sodium chloride (NS) flush 5-40 mL  5-40 mL IntraVENous PRN    naloxone (NARCAN) injection 0.4 mg  0.4 mg IntraVENous PRN    aspirin tablet 325 mg  325 mg Oral BID    oxyCODONE IR (ROXICODONE) tablet 5 mg  5 mg Oral Q6H PRN    morphine 10 mg/mL injection 2 mg  2 mg IntraVENous Q4H PRN    cyclobenzaprine (FLEXERIL) tablet 5 mg  5 mg Oral TID PRN    ondansetron hcl (ZOFRAN) tablet 4 mg  4 mg Oral Q6H PRN    acetaminophen (TYLENOL) tablet 1,000 mg  1,000 mg Oral Q6H      Review of Systems   Constitutional: Negative for malaise/fatigue. Respiratory: Negative for cough, shortness of breath and wheezing. Cardiovascular: Negative for chest pain and palpitations.    Gastrointestinal: Negative for abdominal pain, heartburn, nausea and vomiting. Neurological: Negative for headaches. Musculoskeletal: Denies any numbness/tingling of operative extremity    I/O (24Hr): Intake/Output Summary (Last 24 hours) at 5/17/2022 0848  Last data filed at 5/17/2022 0310  Gross per 24 hour   Intake 780 ml   Output    Net 780 ml     Objective  Labs/Imaging/Diagnostics    Labs:  CBC:  Recent Labs     05/16/22  1603   WBC 9.4   RBC 3.88   HGB 12.1   HCT 36.8   MCV 94.8   RDW 13.1        CHEMISTRIES:  Recent Labs     05/16/22  1603   *   K 4.3      CO2 20*   BUN 17   CREA 1.26*   CA 8.5   PT/INR:  Recent Labs     05/16/22  1603   INR 1.1     APTT:No results for input(s): APTT in the last 72 hours. LIVER PROFILE:  Recent Labs     05/16/22  1603   AST 17   ALT 14     Lab Results   Component Value Date/Time    ALT (SGPT) 14 05/16/2022 04:03 PM    AST (SGOT) 17 05/16/2022 04:03 PM    Alk. phosphatase 21 (L) 05/16/2022 04:03 PM    Bilirubin, total 0.4 05/16/2022 04:03 PM       Physical Exam:  Right lower extremity: Dressing clean dry and intact. DonJoy brace in place. No tenderness palpation throughout right lower extremity. No calf pain to palpation. Sensation intact throughout right lower extremity. DP/PT pulse are palpable. Cap refill is 2 seconds. EHL/DF/PF is 5 out of 5. Negative Homans. Right lower extremity neurovascular intact. Assessment//Plan           Patient Active Problem List    Diagnosis Date Noted    Patellofemoral instability of right knee with pain 05/16/2022     Status post tibial tubercle osteotomy with MPFL reconstruction and VMO imbrication. Postop day #1. We will have therapy work on gait training nonweightbearing right lower extremity with crutches or walker. Plan to discharge home this morning. Patient follow-up with Dr. Valencia Cruz as outpatient in approximately 10 to 14 days.     Electronically signed by Lawanda Garibay PA-C on 5/17/2022 at 8:48 AM

## (undated) DEVICE — SUTURE VCRL SZ 2-0 L27IN ABSRB UD L26MM SH 1/2 CIR J417H

## (undated) DEVICE — REM POLYHESIVE ADULT PATIENT RETURN ELECTRODE: Brand: VALLEYLAB

## (undated) DEVICE — Device

## (undated) DEVICE — 20 ML SYRINGE LUER-LOCK TIP: Brand: MONOJECT

## (undated) DEVICE — ROCKER SWITCH PENCIL BLADE ELECTRODE, HOLSTER: Brand: EDGE

## (undated) DEVICE — INTENDED FOR TISSUE SEPARATION, AND OTHER PROCEDURES THAT REQUIRE A SHARP SURGICAL BLADE TO PUNCTURE OR CUT.: Brand: BARD-PARKER ® CARBON RIB-BACK BLADES

## (undated) DEVICE — DRESSING,GAUZE,XEROFORM,CURAD,1"X8",ST: Brand: CURAD

## (undated) DEVICE — SPONGE GZ W4XL4IN COT 12 PLY TYP VII WVN C FLD DSGN

## (undated) DEVICE — PREP SKN CHLRAPRP APL 26ML STR --

## (undated) DEVICE — SUTURE MCRYL SZ 4 0 L18IN ABSRB UD PC 5 L19MM 3 8 CIR SGL Y823G

## (undated) DEVICE — PACK,BASIC,SIRUS,V: Brand: MEDLINE

## (undated) DEVICE — DRAPE,REIN 53X77,STERILE: Brand: MEDLINE

## (undated) DEVICE — SUTURE FIBERLOOP SZ 2-0 L20IN NONABSORBABLE BLU STR NDL AR7234

## (undated) DEVICE — SCREW INTFR L23MM DIA7MM BIOCOMPOSITE FACILITATE IORT TISS
Type: IMPLANTABLE DEVICE | Site: KNEE | Status: NON-FUNCTIONAL
Removed: 2022-05-16

## (undated) DEVICE — KIT INSTR TRANSTIBIAL ACL W/ SAW BLDE DISP

## (undated) DEVICE — Device: Brand: JELCO

## (undated) DEVICE — STERILE PADS FOR KNEE POSITIONING TRIANGLES™ (10/CASE): Brand: KNEE POSITIONING TRIANGLES™

## (undated) DEVICE — SOLUTION IRRIG 1000ML 0.9% SOD CHL USP POUR PLAS BTL

## (undated) DEVICE — C-ARM: Brand: UNBRANDED

## (undated) DEVICE — GUIDEWIRE ARTHSCP L9.25IN 0.62IN THRD W/ TRCR TIP

## (undated) DEVICE — PADDING CST 6IN STERILE --

## (undated) DEVICE — ZIMMER® STERILE DISPOSABLE TOURNIQUET CUFF WITH PLC, DUAL PORT, SINGLE BLADDER, 42 IN. (107 CM)

## (undated) DEVICE — SUT VCRL + 1 36IN CT1 VIO --

## (undated) DEVICE — STRIP,CLOSURE,WOUND,MEDI-STRIP,1/2X4: Brand: MEDLINE

## (undated) DEVICE — SOUTHSIDE TURNOVER: Brand: MEDLINE INDUSTRIES, INC.

## (undated) DEVICE — GOWN,SIRUS,NONRNF,SETINSLV,2XL,18/CS: Brand: MEDLINE

## (undated) DEVICE — DRAPE,EXTREMITY,89X128,STERILE: Brand: MEDLINE

## (undated) DEVICE — SUT MCRYL + 3-0 27IN PS1 UD --

## (undated) DEVICE — BIT DRL DIA3.2MM STR CANN DISP

## (undated) DEVICE — BANDAGE COBAN 4 IN COMPR W4INXL5YD FOAM COHESIVE QUIK STK SELF ADH SFT

## (undated) DEVICE — FAN SPRAY KIT: Brand: PULSAVAC®

## (undated) DEVICE — SYSTEM ANCHOR 4.75 BC LOOP N TRACK
Type: IMPLANTABLE DEVICE | Site: KNEE | Status: NON-FUNCTIONAL
Removed: 2022-05-16

## (undated) DEVICE — KIT INSTR W/ 2.4MM GUIDEPIN SUT PASS WIRE NO2 FIBERWIRE

## (undated) DEVICE — KIT ORTH INCL CLLRD BRKWY AND TUBEROSITY PIN CUT BLK DISP

## (undated) DEVICE — DRAPE,U/ SHT,SPLIT,PLAS,STERIL: Brand: MEDLINE

## (undated) DEVICE — SUT VCRL + 2-0 36IN CT1 UD --